# Patient Record
Sex: MALE | Race: WHITE | URBAN - METROPOLITAN AREA
[De-identification: names, ages, dates, MRNs, and addresses within clinical notes are randomized per-mention and may not be internally consistent; named-entity substitution may affect disease eponyms.]

---

## 2020-07-23 ENCOUNTER — APPOINTMENT (OUTPATIENT)
Dept: URBAN - METROPOLITAN AREA SURGERY 9 | Age: 60
Setting detail: DERMATOLOGY
End: 2020-07-23

## 2020-07-23 DIAGNOSIS — D18.0 HEMANGIOMA: ICD-10-CM

## 2020-07-23 DIAGNOSIS — L738 OTHER SPECIFIED DISEASES OF HAIR AND HAIR FOLLICLES: ICD-10-CM

## 2020-07-23 DIAGNOSIS — L72.8 OTHER FOLLICULAR CYSTS OF THE SKIN AND SUBCUTANEOUS TISSUE: ICD-10-CM

## 2020-07-23 DIAGNOSIS — L663 OTHER SPECIFIED DISEASES OF HAIR AND HAIR FOLLICLES: ICD-10-CM

## 2020-07-23 PROBLEM — D48.5 NEOPLASM OF UNCERTAIN BEHAVIOR OF SKIN: Status: ACTIVE | Noted: 2020-07-23

## 2020-07-23 PROBLEM — L02.821 FURUNCLE OF HEAD [ANY PART, EXCEPT FACE]: Status: ACTIVE | Noted: 2020-07-23

## 2020-07-23 PROCEDURE — OTHER BIOPSY BY SHAVE METHOD: OTHER

## 2020-07-23 PROCEDURE — OTHER REASSURANCE: OTHER

## 2020-07-23 PROCEDURE — 99202 OFFICE O/P NEW SF 15 MIN: CPT | Mod: 25

## 2020-07-23 PROCEDURE — OTHER MIPS QUALITY: OTHER

## 2020-07-23 PROCEDURE — OTHER COUNSELING: OTHER

## 2020-07-23 PROCEDURE — OTHER TREATMENT REGIMEN: OTHER

## 2020-07-23 PROCEDURE — 11102 TANGNTL BX SKIN SINGLE LES: CPT

## 2020-07-23 PROCEDURE — OTHER PRESCRIPTION: OTHER

## 2020-07-23 RX ORDER — CLINDAMYCIN PHOSPHATE 10 MG/ML
SOLUTION TOPICAL
Qty: 1 | Refills: 3 | Status: ERX | COMMUNITY
Start: 2020-07-23

## 2020-07-23 ASSESSMENT — LOCATION ZONE DERM
LOCATION ZONE: EAR
LOCATION ZONE: SCALP
LOCATION ZONE: NECK
LOCATION ZONE: FACE

## 2020-07-23 ASSESSMENT — LOCATION SIMPLE DESCRIPTION DERM
LOCATION SIMPLE: POSTERIOR NECK
LOCATION SIMPLE: LEFT FOREHEAD
LOCATION SIMPLE: LEFT EAR
LOCATION SIMPLE: POSTERIOR SCALP

## 2020-07-23 ASSESSMENT — LOCATION DETAILED DESCRIPTION DERM
LOCATION DETAILED: POSTERIOR MID-PARIETAL SCALP
LOCATION DETAILED: RIGHT SUPERIOR POSTERIOR NECK
LOCATION DETAILED: LEFT SUPERIOR FOREHEAD
LOCATION DETAILED: LEFT POSTERIOR EAR

## 2020-07-23 NOTE — PROCEDURE: BIOPSY BY SHAVE METHOD
Cryotherapy Text: The wound bed was treated with cryotherapy after the biopsy was performed.  and lives with spouse   Denies Nicotine   ETOH < 1 x a month

## 2020-07-23 NOTE — HPI: BUMPS
Is This A New Presentation, Or A Follow-Up?: Bumps
Additional History: His PCP gave him unknown Rx fungal shampoo, did not seem helpful. No improvement with otc dandruff shampoos either.
Is This A New Presentation, Or A Follow-Up?: Bump
Additional History: Seems like a cyst, sometimes larger than others. Sometimes has “cheesy” discharge.

## 2020-07-23 NOTE — HPI: SKIN LESION
Is This A New Presentation, Or A Follow-Up?: Skin Lesion
Additional History: Seems more red than it was previously. Cut this while shaving hair which made it bleed once.

## 2020-07-23 NOTE — PROCEDURE: TREATMENT REGIMEN
Plan: If not improving, consider oral atb.\\nDiscussed trying longer hair to avoid close shaves
Detail Level: Zone
Initiate Treatment: Clindamycin solution